# Patient Record
Sex: FEMALE | Race: WHITE | NOT HISPANIC OR LATINO | Employment: FULL TIME | URBAN - METROPOLITAN AREA
[De-identification: names, ages, dates, MRNs, and addresses within clinical notes are randomized per-mention and may not be internally consistent; named-entity substitution may affect disease eponyms.]

---

## 2024-01-08 ENCOUNTER — TELEPHONE (OUTPATIENT)
Dept: PRIMARY CARE | Facility: CLINIC | Age: 39
End: 2024-01-08
Payer: COMMERCIAL

## 2024-01-08 DIAGNOSIS — F41.9 ANXIETY: Primary | ICD-10-CM

## 2024-01-08 RX ORDER — ESCITALOPRAM OXALATE 20 MG/1
20 TABLET ORAL
Status: CANCELLED | OUTPATIENT
Start: 2024-01-08

## 2024-01-08 RX ORDER — ESCITALOPRAM OXALATE 20 MG/1
20 TABLET ORAL
COMMUNITY
Start: 2022-08-15 | End: 2024-01-09 | Stop reason: SDUPTHER

## 2024-01-08 NOTE — TELEPHONE ENCOUNTER
Name of medication & dose:  Escitalopram Oxalate 20 MG     Quantity & refills requested: n/a    Amount of medication remaining:  n/a    If out of medication, for how long? n/a     Last office visit:  1/13/2023    Last labs (if applicable):      Future appointment? 2/1/24- offered next available 1/10/24 and patient declined due to work schedule    Pharmacy verified.  on file    Allergies updated.       The patient was informed the requested medication request will be processed within 3 business days unless they are contacted by a staff member to advise otherwise.

## 2024-01-09 RX ORDER — ESCITALOPRAM OXALATE 20 MG/1
20 TABLET ORAL
Qty: 90 TABLET | Refills: 0 | Status: SHIPPED | OUTPATIENT
Start: 2024-01-09 | End: 2024-02-01 | Stop reason: SDUPTHER

## 2024-02-01 ENCOUNTER — OFFICE VISIT (OUTPATIENT)
Dept: PRIMARY CARE | Facility: CLINIC | Age: 39
End: 2024-02-01
Payer: COMMERCIAL

## 2024-02-01 VITALS
WEIGHT: 217 LBS | HEART RATE: 72 BPM | SYSTOLIC BLOOD PRESSURE: 118 MMHG | OXYGEN SATURATION: 95 % | DIASTOLIC BLOOD PRESSURE: 82 MMHG | BODY MASS INDEX: 36.11 KG/M2

## 2024-02-01 DIAGNOSIS — M54.9 UPPER BACK PAIN: ICD-10-CM

## 2024-02-01 DIAGNOSIS — F41.9 ANXIETY: Primary | ICD-10-CM

## 2024-02-01 DIAGNOSIS — Z13.220 SCREENING FOR HYPERLIPIDEMIA: ICD-10-CM

## 2024-02-01 DIAGNOSIS — N62 LARGE BREASTS: ICD-10-CM

## 2024-02-01 DIAGNOSIS — Z13.1 SCREENING FOR DIABETES MELLITUS: ICD-10-CM

## 2024-02-01 PROCEDURE — 1036F TOBACCO NON-USER: CPT | Performed by: FAMILY MEDICINE

## 2024-02-01 PROCEDURE — 99214 OFFICE O/P EST MOD 30 MIN: CPT | Performed by: FAMILY MEDICINE

## 2024-02-01 RX ORDER — ESCITALOPRAM OXALATE 20 MG/1
20 TABLET ORAL
Qty: 90 TABLET | Refills: 3 | Status: SHIPPED | OUTPATIENT
Start: 2024-02-01

## 2024-02-01 RX ORDER — CEFDINIR 300 MG/1
300 CAPSULE ORAL EVERY 12 HOURS
COMMUNITY
Start: 2024-01-26

## 2024-02-01 RX ORDER — BENZONATATE 100 MG/1
100 CAPSULE ORAL 3 TIMES DAILY PRN
COMMUNITY
Start: 2024-01-15

## 2024-02-01 NOTE — PATIENT INSTRUCTIONS
"Get a step counter and use it.  Netflix \"Secrets of the Blue Zones.\"   3.    Focus on more fruits/veggies, less processed food, more water  4.   After a couple months get fasting labs done.  "

## 2024-02-01 NOTE — PROGRESS NOTES
Subjective   Patient ID: Livia Renteria is a 38 y.o. female who presents for Anxiety (Patient is here for anxiety follow up and med refill. ).  She forgot to schedule and is overdue for wellness.  She is happy with her current dose of Lexapro and wants to continue this.    She asked about a referral to possibly consider breast reduction surgery.  She has to buy special bras and she has a lot of upper back pain and she finally feels ready to do this.  Sometimes the upper back pain seems to creep up her neck and cause headaches also.    Histories reviewed      Objective   /82   Pulse 72   Wt 98.4 kg (217 lb)   LMP 01/31/2024   SpO2 95%   BMI 36.11 kg/m²    Physical Exam    Alert adult, NAD  Affect pleasant  Heart RRR no murmur  Lungs CTA bilat  Patient was not undressed but she does have large breasts    Problem List Items Addressed This Visit    None  Visit Diagnoses         Codes    Anxiety    -  Primary F41.9    Relevant Medications    escitalopram (Lexapro) 20 mg tablet    Screening for hyperlipidemia     Z13.220    Relevant Orders    Lipid Panel    Screening for diabetes mellitus     Z13.1    Relevant Orders    Hemoglobin A1C    Large breasts     N62    Relevant Orders    Referral to Plastic Surgery    Upper back pain     M54.9    Relevant Orders    Referral to Plastic Surgery          Previsit labs ordered for her upcoming wellness visit

## 2024-04-23 ENCOUNTER — OFFICE VISIT (OUTPATIENT)
Dept: DERMATOLOGY | Facility: CLINIC | Age: 39
End: 2024-04-23
Payer: COMMERCIAL

## 2024-04-23 DIAGNOSIS — L71.9 ROSACEA: ICD-10-CM

## 2024-04-23 DIAGNOSIS — L81.4 LENTIGO: ICD-10-CM

## 2024-04-23 DIAGNOSIS — D22.71 MELANOCYTIC NEVI OF RIGHT LOWER LIMB, INCLUDING HIP: ICD-10-CM

## 2024-04-23 DIAGNOSIS — D22.60 MELANOCYTIC NEVI OF UNSPECIFIED UPPER LIMB, INCLUDING SHOULDER: ICD-10-CM

## 2024-04-23 DIAGNOSIS — L57.8 PHOTOAGING OF SKIN: ICD-10-CM

## 2024-04-23 DIAGNOSIS — Z12.83 ENCOUNTER FOR SCREENING FOR MALIGNANT NEOPLASM OF SKIN: Primary | ICD-10-CM

## 2024-04-23 DIAGNOSIS — D18.01 HEMANGIOMA OF SKIN: ICD-10-CM

## 2024-04-23 DIAGNOSIS — L21.9 SEBORRHEIC DERMATITIS: ICD-10-CM

## 2024-04-23 DIAGNOSIS — D22.72 MELANOCYTIC NEVI OF LEFT LOWER EXTREMITY OR HIP: ICD-10-CM

## 2024-04-23 PROCEDURE — 99214 OFFICE O/P EST MOD 30 MIN: CPT | Performed by: DERMATOLOGY

## 2024-04-23 PROCEDURE — 1036F TOBACCO NON-USER: CPT | Performed by: DERMATOLOGY

## 2024-04-23 RX ORDER — FLUOCINONIDE TOPICAL SOLUTION USP, 0.05% 0.5 MG/ML
SOLUTION TOPICAL
Qty: 60 ML | Refills: 3 | Status: SHIPPED | OUTPATIENT
Start: 2024-04-23

## 2024-04-23 RX ORDER — METRONIDAZOLE 7.5 MG/G
CREAM TOPICAL
Qty: 45 G | Refills: 3 | Status: SHIPPED | OUTPATIENT
Start: 2024-04-23

## 2024-04-23 RX ORDER — SULFACETAMIDE SODIUM, SULFUR 100; 50 MG/G; MG/G
EMULSION TOPICAL
Qty: 681 G | Refills: 1 | Status: SHIPPED | OUTPATIENT
Start: 2024-04-23

## 2024-04-23 RX ORDER — KETOCONAZOLE 20 MG/ML
SHAMPOO, SUSPENSION TOPICAL
Qty: 120 ML | Refills: 11 | Status: SHIPPED | OUTPATIENT
Start: 2024-04-23

## 2024-04-23 RX ORDER — SULFACETAMIDE SODIUM, SULFUR 100; 50 MG/G; MG/G
EMULSION TOPICAL
Qty: 227 G | Refills: 11 | Status: SHIPPED | OUTPATIENT
Start: 2024-04-23 | End: 2024-04-23

## 2024-04-23 ASSESSMENT — DERMATOLOGY PATIENT ASSESSMENT
ARE YOU TRYING TO GET PREGNANT: NO
WHAT TYPE OF BIRTH CONTROL: IUD
WHERE ARE THESE NEW OR CHANGING LESIONS LOCATED: FACE
ARE YOU AN ORGAN TRANSPLANT RECIPIENT: NO
HAVE YOU HAD OR DO YOU HAVE VASCULAR DISEASE: NO
DO YOU HAVE ANY NEW OR CHANGING LESIONS: YES
FOR PATIENTS COMING IN FOR A FOLLOW-UP VISIT - HAVE THERE BEEN ANY CHANGES IN YOUR HEALTH SINCE YOUR LAST VISIT: NO
ARE YOU ON BIRTH CONTROL: YES
DO YOU HAVE IRREGULAR MENSTRUAL CYCLES: NO
DO YOU USE A TANNING BED: NO
HAVE YOU HAD OR DO YOU HAVE A STAPH INFECTION: NO
DO YOU USE SUNSCREEN: OCCASIONALLY

## 2024-04-23 ASSESSMENT — DERMATOLOGY QUALITY OF LIFE (QOL) ASSESSMENT
RATE HOW BOTHERED YOU ARE BY SYMPTOMS OF YOUR SKIN PROBLEM (EG, ITCHING, STINGING BURNING, HURTING OR SKIN IRRITATION): 6 - ALWAYS BOTHERED
RATE HOW EMOTIONALLY BOTHERED YOU ARE BY YOUR SKIN PROBLEM (FOR EXAMPLE, WORRY, EMBARRASSMENT, FRUSTRATION): 6 - ALWAYS BOTHERED
RATE HOW BOTHERED YOU ARE BY EFFECTS OF YOUR SKIN PROBLEMS ON YOUR ACTIVITIES (EG, GOING OUT, ACCOMPLISHING WHAT YOU WANT, WORK ACTIVITIES OR YOUR RELATIONSHIPS WITH OTHERS): 6 - ALWAYS BOTHERED
ARE THERE EXCLUSIONS OR EXCEPTIONS FOR THE QUALITY OF LIFE ASSESSMENT: NO
DATE THE QUALITY-OF-LIFE ASSESSMENT WAS COMPLETED: 66953
WHAT SINGLE SKIN CONDITION LISTED BELOW IS THE PATIENT ANSWERING THE QUALITY-OF-LIFE ASSESSMENT QUESTIONS ABOUT: NONE OF THE ABOVE

## 2024-04-23 ASSESSMENT — PATIENT GLOBAL ASSESSMENT (PGA): PATIENT GLOBAL ASSESSMENT: PATIENT GLOBAL ASSESSMENT:  3 - MODERATE

## 2024-04-23 ASSESSMENT — ITCH NUMERIC RATING SCALE: HOW SEVERE IS YOUR ITCHING?: 2

## 2024-04-23 NOTE — PROGRESS NOTES
Lucas Renteria is a 38 y.o. female who presents for the following: Skin Check (Pt here for FBSe with no reported hx. Pt reports areas of concern on face. ), Rash (Pt here for Rash on face and neck for 1 month. Currently using Ketoconazole Shampoo and Cream. Has tried MOMETASONE 0.1% CREAM 45GM. Pt reports dryness, redness, itching and bumps. /), and Seborrheic Dermatitis (Pt here to follow up for Seborrheic Dermatitis on Scalp and Ears. Pt currently using Ketoconazole Cream and Shampoo. ).     Review of Systems:  No other skin or systemic complaints other than what is documented elsewhere in the note.    The following portions of the chart were reviewed this encounter and updated as appropriate:         Skin Cancer History  No skin cancer on file.      Specialty Problems    None       Objective   Well appearing patient in no apparent distress; mood and affect are within normal limits.    A full examination was performed including scalp, head, eyes, ears, nose, lips, neck, chest, axillae, abdomen, back, buttocks, bilateral upper extremities, bilateral lower extremities, hands, feet, fingers, toes, fingernails, and toenails. All findings within normal limits unless otherwise noted below.    Assessment/Plan   1. Encounter for screening for malignant neoplasm of skin  No suspicious lesions noted on examination today    The risk of chronic, cumulative sun damage and risk of development of skin cancer was reviewed today.   The importance of sun protection was reviewed: including the use of a broad spectrum sunscreen that protects against both UVA/UVB rays, with ingredients such as Zinc oxide or titanium dioxide, wearing sun protective clothing and sun avoidance. We reviewed the warning signs of non-melanoma skin cancer and ABCDEs of melanoma  Please follow up should you notice any new or changing pre-existing skin lesion.    Related Procedures  Follow Up In Dermatology - Established Patient    2. Seborrheic  dermatitis  Left Cavum, Right Cavum, Scalp  Erythema with overlying greasy scale.    The chronic and intermittently flaring nature of this skin condition was discussed with patient today.   This is due to a yeast that everyone has on their skin that results in redness, dryness and in some patients itching.    Various treatment options were reviewed including topical antifungals and topical steroids depending upon severity and symptoms. Patient advised we cannot cure this condition, but it can be controlled.     Continue ketoconazole 2% shampoo every other day for 5 minutes then rinse    Add fluocinonide 0.05% scalp solution. Patient to apply 2x daily x 2 wks then decrease to 2x/day 2 days per week. Can repeat full 2 week course as often as every 6-8 weeks as needed for flaring. Side effects of topical steroids includes, but is not limited to skin atrophy and dyspigmentation.      ketoconazole (NIZOral) 2 % shampoo - Left Cavum, Right Cavum, Scalp  Lather for 3-5 minutes every other day    Related Medications  fluocinonide (Lidex) 0.05 % external solution  Apply to scalp 2x daily x 2 weeks then 2x daily 2-3 days/wk if needed    3. Photoaging of skin  Mottled pigmentation with telangiectasias and brown reticular macules in sun exposed areas of the body.    The risk of chronic, cumulative sun damage and risk of development of skin cancer was reviewed today.   The importance of sun protection was reviewed: including the use of a broad spectrum sunscreen that protects against both UVA/UVB rays, with ingredients such as Zinc oxide or titanium dioxide, wearing sun protective clothing and sun avoidance. We reviewed the warning signs of non-melanoma skin cancer and ABCDEs of melanoma  Please follow up should you notice any new or changing pre-existing skin lesion.    4. Rosacea  Head - Anterior (Face)  Mid face erythema with telangiectasias and scattered inflammatory papules.    The chronic and intermittently flaring nature  of the skin condition was discussed with the patient today. Discussed common triggers associated with rosacea including sun exposure, spicy foods, alcohol, and stress. The various treatment options and risks and benefits reviewed.     Patient to begin sulfacetamide sodium-sulfur 10-5% cleanser daily, metronidazole 0.75% cream 2x daily.  Do not restart topical mometasone due to rebound that is likely to occur with use.    Minocycyline in reserve    sulfacetamide sodium-sulfur (Avar, Plexion) 10-5 % (w/w) topical emulsion - Head - Anterior (Face)  Lather for 1-2 minutes on face then rinse once daily    metroNIDAZOLE (Metrocream) 0.75 % cream - Head - Anterior (Face)  Apply to face 2x daily    5. Lentigo  Scattered tan macules in sun-exposed areas.    These are benign skin lesions due to sun exposure. They will darken in response to sun exposure. They should be monitored for change in size, shape or color.  These lesions can be treated cosmetically with topical creams, liquid nitrogen and a variety of lasers.    6. Hemangioma of skin  Cherry red papules    The benign nature of these skin lesions were reviewed, no treatment is necessary.   Please follow up for any new or pre-existing lesion that is changing in size, shape, color, becomes painful, tender, itches or bleed.    7. Melanocytic nevi of unspecified upper limb, including shoulder (2)  Left Arm, Right Arm  Scattered, uniform and benign-appearing, regular brown melanocytic papules and macules.    Clinically benign appearing nevi, no treatment is necessary.  The importance of sun protection was reviewed: including the use of a broad spectrum sunscreen that protects against both UVA/UVB rays, with ingredients such as Zinc oxide or titanium dioxide, wearing sun protective clothing and sun avoidance.   ABCDEs of melanoma reviewed.  Please follow up should you notice any new or changing pre-existing skin lesion.    8. Melanocytic nevi of left lower extremity or  hip  Left Leg  Scattered, uniform and benign-appearing, regular brown melanocytic papules and macules.    Clinically benign appearing nevi, no treatment is necessary.  The importance of sun protection was reviewed: including the use of a broad spectrum sunscreen that protects against both UVA/UVB rays, with ingredients such as Zinc oxide or titanium dioxide, wearing sun protective clothing and sun avoidance.   ABCDEs of melanoma reviewed.  Please follow up should you notice any new or changing pre-existing skin lesion.    9. Melanocytic nevi of right lower limb, including hip  Right Leg  Scattered, uniform and benign-appearing, regular brown melanocytic papules and macules.    Clinically benign appearing nevi, no treatment is necessary.  The importance of sun protection was reviewed: including the use of a broad spectrum sunscreen that protects against both UVA/UVB rays, with ingredients such as Zinc oxide or titanium dioxide, wearing sun protective clothing and sun avoidance.   ABCDEs of melanoma reviewed.  Please follow up should you notice any new or changing pre-existing skin lesion.        Follow up in 1 year.

## 2024-05-06 ENCOUNTER — OFFICE VISIT (OUTPATIENT)
Dept: PLASTIC SURGERY | Facility: CLINIC | Age: 39
End: 2024-05-06
Payer: COMMERCIAL

## 2024-05-06 VITALS
SYSTOLIC BLOOD PRESSURE: 115 MMHG | HEART RATE: 67 BPM | DIASTOLIC BLOOD PRESSURE: 80 MMHG | HEIGHT: 64 IN | WEIGHT: 216.4 LBS | BODY MASS INDEX: 36.95 KG/M2

## 2024-05-06 DIAGNOSIS — M54.9 UPPER BACK PAIN: ICD-10-CM

## 2024-05-06 DIAGNOSIS — N62 LARGE BREASTS: ICD-10-CM

## 2024-05-06 PROCEDURE — 99204 OFFICE O/P NEW MOD 45 MIN: CPT | Performed by: PHYSICIAN ASSISTANT

## 2024-05-06 NOTE — PROGRESS NOTES
Department of Plastic and Reconstructive Surgery           Initial Office Consultation    Date: 05/06/24  Referring Provider: Deborah Ozuna MD  Primary Care Provider: Deborah Ozuna MD      Lucas Renteria is a 38 y.o. female who was referred by Deborah Ozuna MD  for evaluation of upper back pain secondary to macromastia.    She presents today to discuss ongoing back and shoulder pain. She endorses that since a teenager she has always had larger breasts. She notes that she sought consultation for breast reduction at the age of 19 however decided against it as she wanted to wait until she was finished having children. She endorses daily pain that increases with activity and with working and exercising. She rates her pain at 4/10 at baseline and will increase from there.  She endorses bra stap grooving from the weight of her breast. She has attempted conservative measures for her pain including heat/ice, OTC pain medications including tylenol and ibuprofen for her pain without resolution. She has attempted physical therapy in the past and denied improvement or resolution of symptoms.  She endorses that her breasts slightly increased in size after pregnancy and breast feeding. Her notes her current bra size is a 40F/G. She has complaints of bra strap grooving and neck/shoulder discomfort.       PMH: anxiety  Surgical Hx: right knee surgery 28 yrs ago  Family Hx: non-contributory  Smoking: non-smoker      Review of Systems   All other systems have been reviewed with the patient and have been found to be negative with exception to the chief complaint as mentioned in the history of present illness.    ROS: As noted in history of present illness    - CONSTITUTIONAL: Denies weight loss, fever and chills.  - HEENT: Denies changes in vision and hearing.  - RESPIRATORY: Denies SOB and cough, difficulty breathing  - CV: Denies palpitations and CP  - GI: Denies abdominal pain, nausea, vomiting and  diarrhea.  - : Denies dysuria and urinary frequency.  - MSK: positive for back and shoulder pain  - SKIN: Denies rash and pruritus.  - NEUROLOGICAL: Denies headache and syncope.    Objective   Vital Signs:   Vitals:    05/06/24 1054   BP: 115/80   Pulse: 67         Gen: interactive and pleasant  Head: NCAT  Eyes: EOMI, PERRLA  Mouth: MMM  Throat: trachea midline  Cor: RRR  Pulm: nonlabored breathing  Abd: s/nt/nd  Neuro: AAOx3  Ext: extremities perfused    Focused exam of the: breast                                R                  L  SN:NAC             33cm              33.5cm  NAC:IMF            17cm             18cm         BW                    15cm              15cm        NAC diam         5m              5cm                                                                                           Ptosis Grade     2               2                                                       Bra Size         40F/G     Body mass index is 37.14 kg/m².  Body surface area is 2.11 meters squared.      Assessment/Plan     Livia Renteria is a 38 y.o. female who was referred by Deborah Ozuna MD for evaluation of upper back pain secondary to macromastia.    She did undoubtedly has macromastia which is symptomatic causing her pain, bra strap grooving, trapezial hypertrophy. She has attempted PT, chiropractic, massage, OTC pain medications, and conservative measures for pain control without improvement or resolution of daily discomfort. Today we discussed moving forward with surgical intervention for continued symptomatic macromastia. I educated the patient on the Schnur scale as a use of measurement to determine to amount of breast tissue to take based on body surface area. Her BSA is 2.1. The schnur scale places her at the required amount per side of 750cc.     I discussed with the patient that I believe this can be easily achieved and will help to alleviate back and neck pain she is experiencing. Patient verbalized  understanding of the size requirement based on the Schnur scale and agrees to continue with surgical planning for breast reduction. We discussed today she intends to lose weight prior to surgery with goal BMI of 35 or less. We discussed she plans to lose 7lbs.        Plan:   Case submitted for bilateral breast reduction.     I spent 45 minutes with this patient. Greater than 50% of this time was spent in the counselling and/or coordination of care of this patient.  This note was created using voice recognition software and was not corrected for typographical or grammatical errors.    Signature: Wanda Jurado PA-C  Date: 05/06/24

## 2024-05-29 ENCOUNTER — PREP FOR PROCEDURE (OUTPATIENT)
Dept: OCCUPATIONAL MEDICINE | Facility: HOSPITAL | Age: 39
End: 2024-05-29
Payer: COMMERCIAL

## 2024-05-29 DIAGNOSIS — N62 MACROMASTIA: Primary | ICD-10-CM

## 2024-08-05 ENCOUNTER — APPOINTMENT (OUTPATIENT)
Dept: PLASTIC SURGERY | Facility: CLINIC | Age: 39
End: 2024-08-05
Payer: COMMERCIAL

## 2024-08-05 VITALS
HEART RATE: 79 BPM | BODY MASS INDEX: 37.59 KG/M2 | WEIGHT: 220.2 LBS | HEIGHT: 64 IN | SYSTOLIC BLOOD PRESSURE: 119 MMHG | DIASTOLIC BLOOD PRESSURE: 86 MMHG

## 2024-08-05 DIAGNOSIS — N62 MACROMASTIA: Primary | ICD-10-CM

## 2024-08-05 PROCEDURE — 3008F BODY MASS INDEX DOCD: CPT | Performed by: SURGERY

## 2024-08-05 PROCEDURE — 99213 OFFICE O/P EST LOW 20 MIN: CPT | Performed by: SURGERY

## 2024-08-05 NOTE — PROGRESS NOTES
Department of Plastic and Reconstructive Surgery           Initial Office Consultation    Date: 08/05/24  Referring Provider: Deborah Ozuna MD  Primary Care Provider: Deborah Ozuna MD      Lucas Renteria is a 38 y.o. female who was referred by Deborah Ozuna MD  for evaluation of upper back pain secondary to macromastia.    She presents today to discuss ongoing back and shoulder pain. She endorses that since a teenager she has always had larger breasts. She notes that she sought consultation for breast reduction at the age of 19 however decided against it as she wanted to wait until she was finished having children. She endorses daily pain that increases with activity and with working and exercising. She rates her pain at 4/10 at baseline and will increase from there.  She endorses bra stap grooving from the weight of her breast. She has attempted conservative measures for her pain including heat/ice, OTC pain medications including tylenol and ibuprofen for her pain without resolution. She has attempted physical therapy in the past and denied improvement or resolution of symptoms.  She endorses that her breasts slightly increased in size after pregnancy and breast feeding. Her notes her current bra size is a 40F/G. She has complaints of bra strap grooving and neck/shoulder discomfort.       PMH: anxiety  Surgical Hx: right knee surgery 28 yrs ago  Family Hx: non-contributory  Smoking: non-smoker      Review of Systems   All other systems have been reviewed with the patient and have been found to be negative with exception to the chief complaint as mentioned in the history of present illness.    ROS: As noted in history of present illness    - CONSTITUTIONAL: Denies weight loss, fever and chills.  - HEENT: Denies changes in vision and hearing.  - RESPIRATORY: Denies SOB and cough, difficulty breathing  - CV: Denies palpitations and CP  - GI: Denies abdominal pain, nausea, vomiting and  diarrhea.  - : Denies dysuria and urinary frequency.  - MSK: positive for back and shoulder pain  - SKIN: Denies rash and pruritus.  - NEUROLOGICAL: Denies headache and syncope.    Objective   Vital Signs:   There were no vitals filed for this visit.        Gen: interactive and pleasant  Head: NCAT  Eyes: EOMI, PERRLA  Mouth: MMM  Throat: trachea midline  Cor: RRR  Pulm: nonlabored breathing  Abd: s/nt/nd  Neuro: AAOx3  Ext: extremities perfused    Focused exam of the: breast                                R                  L  SN:NAC             33cm              33.5cm  NAC:IMF            17cm             18cm         BW                    15cm              15cm        NAC diam         5m              5cm                                                                                           Ptosis Grade     2               2                                                       Bra Size         40F/G     Body mass index is 37.14 kg/m².  Body surface area is 2.11 meters squared.      Assessment/Plan     Livia Renteria is a 38 y.o. female who was referred by Deborah Ozuna MD for evaluation of upper back pain secondary to macromastia.    She did undoubtedly has macromastia which is symptomatic causing her pain, bra strap grooving, trapezial hypertrophy. She has attempted PT, chiropractic, massage, OTC pain medications, and conservative measures for pain control without improvement or resolution of daily discomfort. Today we discussed moving forward with surgical intervention for continued symptomatic macromastia. I educated the patient on the Schnur scale as a use of measurement to determine to amount of breast tissue to take based on body surface area. Her BSA is 2.1. The schnur scale places her at the required amount per side of 750cc.     I discussed with the patient that I believe this can be easily achieved and will help to alleviate back and neck pain she is experiencing. Patient verbalized  understanding of the size requirement based on the Schnur scale and agrees to continue with surgical planning for breast reduction. We discussed today she intends to lose weight prior to surgery with goal BMI of 35 or less. We discussed she plans to lose 7lbs.        Plan:   Case submitted for bilateral breast reduction.   Scheduled for 9/17/2024      I discussed with the patient that this is reconstructive surgery, and there are no guarantees of results.  Sometimes, patients are dissatisfied and sometimes patients require revision surgery.  If there is a need to return to surgery to correct any problems or complications, we indicated that in some instances, the return to the operating room may not covered by insurance.  I indicated that I do not charge the patient for return to the operating room, but there WILL BE charges for operating room/facility fees and anesthesia fees, over which we have no control.       I spent 45 minutes with this patient. Greater than 50% of this time was spent in the counselling and/or coordination of care of this patient.  This note was created using voice recognition software and was not corrected for typographical or grammatical errors.    Signature: Morris Martinez MD  Date: 08/05/24

## 2024-08-29 ENCOUNTER — TELEPHONE (OUTPATIENT)
Dept: PLASTIC SURGERY | Facility: CLINIC | Age: 39
End: 2024-08-29
Payer: COMMERCIAL

## 2024-08-29 DIAGNOSIS — Z01.818 PREOP TESTING: ICD-10-CM

## 2024-08-29 NOTE — TELEPHONE ENCOUNTER
Patient called to inquire if it was okay that she get an epidural pain injection on 9/3/2024 prior to her scheduled breast reduction on 9/17/2024.  I spoke with Dr. Martinez who stated patient may get the pain injection.  I updated patient with this information.  I also reviewed preop and post op instructions with patient.

## 2024-09-03 RX ORDER — CHLORHEXIDINE GLUCONATE 40 MG/ML
SOLUTION TOPICAL ONCE
Qty: 236 ML | Refills: 0 | Status: SHIPPED | OUTPATIENT
Start: 2024-09-03 | End: 2024-09-03

## 2024-09-06 ENCOUNTER — LAB (OUTPATIENT)
Dept: LAB | Facility: LAB | Age: 39
End: 2024-09-06
Payer: COMMERCIAL

## 2024-09-06 DIAGNOSIS — Z01.818 PREOP TESTING: ICD-10-CM

## 2024-09-06 LAB
ALBUMIN SERPL BCP-MCNC: 4.1 G/DL (ref 3.4–5)
ALP SERPL-CCNC: 42 U/L (ref 33–110)
ALT SERPL W P-5'-P-CCNC: 19 U/L (ref 7–45)
ANION GAP SERPL CALC-SCNC: 10 MMOL/L (ref 10–20)
AST SERPL W P-5'-P-CCNC: 14 U/L (ref 9–39)
BILIRUB SERPL-MCNC: 1 MG/DL (ref 0–1.2)
BUN SERPL-MCNC: 14 MG/DL (ref 6–23)
CALCIUM SERPL-MCNC: 8.9 MG/DL (ref 8.6–10.3)
CHLORIDE SERPL-SCNC: 104 MMOL/L (ref 98–107)
CO2 SERPL-SCNC: 26 MMOL/L (ref 21–32)
CREAT SERPL-MCNC: 0.78 MG/DL (ref 0.5–1.05)
EGFRCR SERPLBLD CKD-EPI 2021: >90 ML/MIN/1.73M*2
ERYTHROCYTE [DISTWIDTH] IN BLOOD BY AUTOMATED COUNT: 11.8 % (ref 11.5–14.5)
GLUCOSE SERPL-MCNC: 108 MG/DL (ref 74–99)
HCT VFR BLD AUTO: 40.5 % (ref 36–46)
HGB BLD-MCNC: 13.4 G/DL (ref 12–16)
MCH RBC QN AUTO: 32.3 PG (ref 26–34)
MCHC RBC AUTO-ENTMCNC: 33.1 G/DL (ref 32–36)
MCV RBC AUTO: 98 FL (ref 80–100)
NRBC BLD-RTO: 0 /100 WBCS (ref 0–0)
PLATELET # BLD AUTO: 199 X10*3/UL (ref 150–450)
POTASSIUM SERPL-SCNC: 4.1 MMOL/L (ref 3.5–5.3)
PROT SERPL-MCNC: 6.1 G/DL (ref 6.4–8.2)
RBC # BLD AUTO: 4.15 X10*6/UL (ref 4–5.2)
SODIUM SERPL-SCNC: 136 MMOL/L (ref 136–145)
WBC # BLD AUTO: 7 X10*3/UL (ref 4.4–11.3)

## 2024-09-06 PROCEDURE — 80053 COMPREHEN METABOLIC PANEL: CPT

## 2024-09-06 PROCEDURE — 85027 COMPLETE CBC AUTOMATED: CPT

## 2024-09-06 PROCEDURE — 36415 COLL VENOUS BLD VENIPUNCTURE: CPT

## 2024-09-09 RX ORDER — TIZANIDINE HYDROCHLORIDE 4 MG/1
4 CAPSULE, GELATIN COATED ORAL 3 TIMES DAILY
COMMUNITY

## 2024-09-09 RX ORDER — GABAPENTIN 300 MG/1
300 CAPSULE ORAL 3 TIMES DAILY
COMMUNITY

## 2024-09-09 RX ORDER — CELECOXIB 200 MG/1
200 CAPSULE ORAL DAILY
COMMUNITY

## 2024-09-17 ENCOUNTER — HOSPITAL ENCOUNTER (OUTPATIENT)
Facility: CLINIC | Age: 39
Setting detail: OUTPATIENT SURGERY
Discharge: HOME | End: 2024-09-17
Attending: SURGERY | Admitting: SURGERY
Payer: COMMERCIAL

## 2024-09-17 ENCOUNTER — ANESTHESIA (OUTPATIENT)
Dept: OPERATING ROOM | Facility: CLINIC | Age: 39
End: 2024-09-17
Payer: COMMERCIAL

## 2024-09-17 ENCOUNTER — ANESTHESIA EVENT (OUTPATIENT)
Dept: OPERATING ROOM | Facility: CLINIC | Age: 39
End: 2024-09-17
Payer: COMMERCIAL

## 2024-09-17 VITALS
TEMPERATURE: 97.2 F | HEART RATE: 64 BPM | OXYGEN SATURATION: 96 % | DIASTOLIC BLOOD PRESSURE: 65 MMHG | SYSTOLIC BLOOD PRESSURE: 103 MMHG | RESPIRATION RATE: 16 BRPM

## 2024-09-17 DIAGNOSIS — N62 MACROMASTIA: Primary | ICD-10-CM

## 2024-09-17 DIAGNOSIS — R11.2 PONV (POSTOPERATIVE NAUSEA AND VOMITING): ICD-10-CM

## 2024-09-17 DIAGNOSIS — G89.18 POST-OP PAIN: ICD-10-CM

## 2024-09-17 DIAGNOSIS — Z98.890 PONV (POSTOPERATIVE NAUSEA AND VOMITING): ICD-10-CM

## 2024-09-17 PROCEDURE — 3700000001 HC GENERAL ANESTHESIA TIME - INITIAL BASE CHARGE: Performed by: SURGERY

## 2024-09-17 PROCEDURE — A19318 PR REDUCTION OF LARGE BREAST: Performed by: NURSE ANESTHETIST, CERTIFIED REGISTERED

## 2024-09-17 PROCEDURE — 2720000007 HC OR 272 NO HCPCS: Performed by: SURGERY

## 2024-09-17 PROCEDURE — 7100000001 HC RECOVERY ROOM TIME - INITIAL BASE CHARGE: Performed by: SURGERY

## 2024-09-17 PROCEDURE — 7100000002 HC RECOVERY ROOM TIME - EACH INCREMENTAL 1 MINUTE: Performed by: SURGERY

## 2024-09-17 PROCEDURE — 19318 BREAST REDUCTION: CPT | Performed by: PHYSICIAN ASSISTANT

## 2024-09-17 PROCEDURE — 2500000001 HC RX 250 WO HCPCS SELF ADMINISTERED DRUGS (ALT 637 FOR MEDICARE OP): Performed by: NURSE ANESTHETIST, CERTIFIED REGISTERED

## 2024-09-17 PROCEDURE — 19318 BREAST REDUCTION: CPT | Performed by: SURGERY

## 2024-09-17 PROCEDURE — 7100000009 HC PHASE TWO TIME - INITIAL BASE CHARGE: Performed by: SURGERY

## 2024-09-17 PROCEDURE — A19318 PR REDUCTION OF LARGE BREAST: Performed by: ANESTHESIOLOGY

## 2024-09-17 PROCEDURE — 3700000002 HC GENERAL ANESTHESIA TIME - EACH INCREMENTAL 1 MINUTE: Performed by: SURGERY

## 2024-09-17 PROCEDURE — 7100000010 HC PHASE TWO TIME - EACH INCREMENTAL 1 MINUTE: Performed by: SURGERY

## 2024-09-17 PROCEDURE — 2500000002 HC RX 250 W HCPCS SELF ADMINISTERED DRUGS (ALT 637 FOR MEDICARE OP, ALT 636 FOR OP/ED): Performed by: NURSE ANESTHETIST, CERTIFIED REGISTERED

## 2024-09-17 PROCEDURE — 3600000008 HC OR TIME - EACH INCREMENTAL 1 MINUTE - PROCEDURE LEVEL THREE: Performed by: SURGERY

## 2024-09-17 PROCEDURE — 15777 ACELLULAR DERM MATRIX IMPLT: CPT | Performed by: SURGERY

## 2024-09-17 PROCEDURE — 2500000004 HC RX 250 GENERAL PHARMACY W/ HCPCS (ALT 636 FOR OP/ED): Performed by: ANESTHESIOLOGY

## 2024-09-17 PROCEDURE — 2500000004 HC RX 250 GENERAL PHARMACY W/ HCPCS (ALT 636 FOR OP/ED): Performed by: NURSE ANESTHETIST, CERTIFIED REGISTERED

## 2024-09-17 PROCEDURE — 3600000003 HC OR TIME - INITIAL BASE CHARGE - PROCEDURE LEVEL THREE: Performed by: SURGERY

## 2024-09-17 PROCEDURE — 2500000005 HC RX 250 GENERAL PHARMACY W/O HCPCS: Performed by: NURSE ANESTHETIST, CERTIFIED REGISTERED

## 2024-09-17 PROCEDURE — 2500000005 HC RX 250 GENERAL PHARMACY W/O HCPCS: Performed by: SURGERY

## 2024-09-17 PROCEDURE — 2780000003 HC OR 278 NO HCPCS: Performed by: SURGERY

## 2024-09-17 PROCEDURE — 2500000001 HC RX 250 WO HCPCS SELF ADMINISTERED DRUGS (ALT 637 FOR MEDICARE OP): Performed by: ANESTHESIOLOGY

## 2024-09-17 DEVICE — IMPLANTABLE DEVICE: Type: IMPLANTABLE DEVICE | Site: BREAST | Status: FUNCTIONAL

## 2024-09-17 RX ORDER — HYDROMORPHONE HYDROCHLORIDE 1 MG/ML
0.4 INJECTION, SOLUTION INTRAMUSCULAR; INTRAVENOUS; SUBCUTANEOUS EVERY 5 MIN PRN
Status: DISCONTINUED | OUTPATIENT
Start: 2024-09-17 | End: 2024-09-17 | Stop reason: HOSPADM

## 2024-09-17 RX ORDER — LIDOCAINE HYDROCHLORIDE 20 MG/ML
INJECTION, SOLUTION INFILTRATION; PERINEURAL AS NEEDED
Status: DISCONTINUED | OUTPATIENT
Start: 2024-09-17 | End: 2024-09-17

## 2024-09-17 RX ORDER — ALBUTEROL SULFATE 0.83 MG/ML
2.5 SOLUTION RESPIRATORY (INHALATION) ONCE AS NEEDED
Status: DISCONTINUED | OUTPATIENT
Start: 2024-09-17 | End: 2024-09-17 | Stop reason: HOSPADM

## 2024-09-17 RX ORDER — BUPIVACAINE HYDROCHLORIDE AND EPINEPHRINE 5; 5 MG/ML; UG/ML
INJECTION, SOLUTION EPIDURAL; INTRACAUDAL; PERINEURAL AS NEEDED
Status: DISCONTINUED | OUTPATIENT
Start: 2024-09-17 | End: 2024-09-17 | Stop reason: HOSPADM

## 2024-09-17 RX ORDER — OXYCODONE HYDROCHLORIDE 5 MG/1
5 TABLET ORAL EVERY 4 HOURS PRN
Status: DISCONTINUED | OUTPATIENT
Start: 2024-09-17 | End: 2024-09-17 | Stop reason: HOSPADM

## 2024-09-17 RX ORDER — SODIUM CHLORIDE, SODIUM LACTATE, POTASSIUM CHLORIDE, CALCIUM CHLORIDE 600; 310; 30; 20 MG/100ML; MG/100ML; MG/100ML; MG/100ML
INJECTION, SOLUTION INTRAVENOUS CONTINUOUS PRN
Status: DISCONTINUED | OUTPATIENT
Start: 2024-09-17 | End: 2024-09-17

## 2024-09-17 RX ORDER — FENTANYL CITRATE 50 UG/ML
INJECTION, SOLUTION INTRAMUSCULAR; INTRAVENOUS AS NEEDED
Status: DISCONTINUED | OUTPATIENT
Start: 2024-09-17 | End: 2024-09-17

## 2024-09-17 RX ORDER — ASPIRIN 81 MG
100 TABLET, DELAYED RELEASE (ENTERIC COATED) ORAL 2 TIMES DAILY
Qty: 10 TABLET | Refills: 0 | Status: SHIPPED | OUTPATIENT
Start: 2024-09-17 | End: 2024-09-22

## 2024-09-17 RX ORDER — METOCLOPRAMIDE HYDROCHLORIDE 5 MG/ML
10 INJECTION INTRAMUSCULAR; INTRAVENOUS ONCE AS NEEDED
Status: DISCONTINUED | OUTPATIENT
Start: 2024-09-17 | End: 2024-09-17 | Stop reason: HOSPADM

## 2024-09-17 RX ORDER — ONDANSETRON 4 MG/1
4 TABLET, ORALLY DISINTEGRATING ORAL EVERY 8 HOURS PRN
Qty: 20 TABLET | Refills: 0 | OUTPATIENT
Start: 2024-09-17

## 2024-09-17 RX ORDER — ACETAMINOPHEN 325 MG/1
TABLET ORAL AS NEEDED
Status: DISCONTINUED | OUTPATIENT
Start: 2024-09-17 | End: 2024-09-17

## 2024-09-17 RX ORDER — APREPITANT 40 MG/1
CAPSULE ORAL AS NEEDED
Status: DISCONTINUED | OUTPATIENT
Start: 2024-09-17 | End: 2024-09-17

## 2024-09-17 RX ORDER — CEFAZOLIN 1 G/1
INJECTION, POWDER, FOR SOLUTION INTRAVENOUS AS NEEDED
Status: DISCONTINUED | OUTPATIENT
Start: 2024-09-17 | End: 2024-09-17

## 2024-09-17 RX ORDER — METHOCARBAMOL 100 MG/ML
INJECTION, SOLUTION INTRAMUSCULAR; INTRAVENOUS AS NEEDED
Status: DISCONTINUED | OUTPATIENT
Start: 2024-09-17 | End: 2024-09-17

## 2024-09-17 RX ORDER — GABAPENTIN 300 MG/1
CAPSULE ORAL AS NEEDED
Status: DISCONTINUED | OUTPATIENT
Start: 2024-09-17 | End: 2024-09-17

## 2024-09-17 RX ORDER — HYDROMORPHONE HYDROCHLORIDE 0.2 MG/ML
0.2 INJECTION INTRAMUSCULAR; INTRAVENOUS; SUBCUTANEOUS EVERY 5 MIN PRN
Status: DISCONTINUED | OUTPATIENT
Start: 2024-09-17 | End: 2024-09-17 | Stop reason: HOSPADM

## 2024-09-17 RX ORDER — PHENYLEPHRINE HCL IN 0.9% NACL 1 MG/10 ML
SYRINGE (ML) INTRAVENOUS AS NEEDED
Status: DISCONTINUED | OUTPATIENT
Start: 2024-09-17 | End: 2024-09-17

## 2024-09-17 RX ORDER — GABAPENTIN 300 MG/1
300 CAPSULE ORAL 3 TIMES DAILY
COMMUNITY

## 2024-09-17 RX ORDER — AMOXICILLIN 875 MG/1
875 TABLET, FILM COATED ORAL 2 TIMES DAILY
COMMUNITY

## 2024-09-17 RX ORDER — HYDROMORPHONE HYDROCHLORIDE 1 MG/ML
INJECTION, SOLUTION INTRAMUSCULAR; INTRAVENOUS; SUBCUTANEOUS AS NEEDED
Status: DISCONTINUED | OUTPATIENT
Start: 2024-09-17 | End: 2024-09-17

## 2024-09-17 RX ORDER — KETAMINE HYDROCHLORIDE 50 MG/ML
INJECTION, SOLUTION INTRAMUSCULAR; INTRAVENOUS AS NEEDED
Status: DISCONTINUED | OUTPATIENT
Start: 2024-09-17 | End: 2024-09-17

## 2024-09-17 RX ORDER — MIDAZOLAM HYDROCHLORIDE 1 MG/ML
INJECTION, SOLUTION INTRAMUSCULAR; INTRAVENOUS AS NEEDED
Status: DISCONTINUED | OUTPATIENT
Start: 2024-09-17 | End: 2024-09-17

## 2024-09-17 RX ORDER — ONDANSETRON HYDROCHLORIDE 2 MG/ML
INJECTION, SOLUTION INTRAVENOUS AS NEEDED
Status: DISCONTINUED | OUTPATIENT
Start: 2024-09-17 | End: 2024-09-17

## 2024-09-17 RX ORDER — GUAIFENESIN, PSEUDOEPHEDRINE HYDROCHLORIDE 600; 60 MG/1; MG/1
1 TABLET, EXTENDED RELEASE ORAL EVERY 12 HOURS
COMMUNITY

## 2024-09-17 RX ORDER — TRANEXAMIC ACID 100 MG/ML
INJECTION, SOLUTION INTRAVENOUS AS NEEDED
Status: DISCONTINUED | OUTPATIENT
Start: 2024-09-17 | End: 2024-09-17

## 2024-09-17 RX ORDER — SODIUM CHLORIDE, SODIUM LACTATE, POTASSIUM CHLORIDE, CALCIUM CHLORIDE 600; 310; 30; 20 MG/100ML; MG/100ML; MG/100ML; MG/100ML
100 INJECTION, SOLUTION INTRAVENOUS CONTINUOUS
Status: DISCONTINUED | OUTPATIENT
Start: 2024-09-17 | End: 2024-09-17 | Stop reason: HOSPADM

## 2024-09-17 RX ORDER — ONDANSETRON HYDROCHLORIDE 2 MG/ML
4 INJECTION, SOLUTION INTRAVENOUS ONCE AS NEEDED
Status: COMPLETED | OUTPATIENT
Start: 2024-09-17 | End: 2024-09-17

## 2024-09-17 RX ORDER — PROPOFOL 10 MG/ML
INJECTION, EMULSION INTRAVENOUS AS NEEDED
Status: DISCONTINUED | OUTPATIENT
Start: 2024-09-17 | End: 2024-09-17

## 2024-09-17 RX ORDER — HYDROCODONE BITARTRATE AND ACETAMINOPHEN 5; 325 MG/1; MG/1
1 TABLET ORAL EVERY 6 HOURS PRN
Qty: 12 TABLET | Refills: 0 | Status: SHIPPED | OUTPATIENT
Start: 2024-09-17 | End: 2024-09-24

## 2024-09-17 RX ORDER — SODIUM CHLORIDE 0.9 G/100ML
IRRIGANT IRRIGATION AS NEEDED
Status: DISCONTINUED | OUTPATIENT
Start: 2024-09-17 | End: 2024-09-17 | Stop reason: HOSPADM

## 2024-09-17 RX ORDER — LIDOCAINE IN NACL,ISO-OSMOT/PF 30 MG/3 ML
0.1 SYRINGE (ML) INJECTION ONCE
Status: DISCONTINUED | OUTPATIENT
Start: 2024-09-17 | End: 2024-09-17 | Stop reason: HOSPADM

## 2024-09-17 RX ORDER — SCOLOPAMINE TRANSDERMAL SYSTEM 1 MG/1
PATCH, EXTENDED RELEASE TRANSDERMAL AS NEEDED
Status: DISCONTINUED | OUTPATIENT
Start: 2024-09-17 | End: 2024-09-17

## 2024-09-17 RX ORDER — ONDANSETRON 4 MG/1
4 TABLET, ORALLY DISINTEGRATING ORAL EVERY 8 HOURS PRN
Qty: 9 TABLET | Refills: 0 | Status: SHIPPED | OUTPATIENT
Start: 2024-09-17

## 2024-09-17 RX ORDER — CELECOXIB 200 MG/1
CAPSULE ORAL AS NEEDED
Status: DISCONTINUED | OUTPATIENT
Start: 2024-09-17 | End: 2024-09-17

## 2024-09-17 SDOH — HEALTH STABILITY: MENTAL HEALTH: CURRENT SMOKER: 0

## 2024-09-17 ASSESSMENT — PAIN SCALES - GENERAL
PAINLEVEL_OUTOF10: 4
PAINLEVEL_OUTOF10: 3
PAINLEVEL_OUTOF10: 4
PAINLEVEL_OUTOF10: 5 - MODERATE PAIN

## 2024-09-17 ASSESSMENT — PAIN DESCRIPTION - LOCATION
LOCATION: CHEST
LOCATION: CHEST

## 2024-09-17 ASSESSMENT — PAIN - FUNCTIONAL ASSESSMENT
PAIN_FUNCTIONAL_ASSESSMENT: 0-10

## 2024-09-17 ASSESSMENT — COLUMBIA-SUICIDE SEVERITY RATING SCALE - C-SSRS
6. HAVE YOU EVER DONE ANYTHING, STARTED TO DO ANYTHING, OR PREPARED TO DO ANYTHING TO END YOUR LIFE?: NO
2. HAVE YOU ACTUALLY HAD ANY THOUGHTS OF KILLING YOURSELF?: NO
1. IN THE PAST MONTH, HAVE YOU WISHED YOU WERE DEAD OR WISHED YOU COULD GO TO SLEEP AND NOT WAKE UP?: NO

## 2024-09-17 NOTE — ANESTHESIA PROCEDURE NOTES
Airway  Date/Time: 9/17/2024 10:10 AM  Urgency: elective    Airway not difficult    Staffing  Performed: CRNA   Authorized by: Conner Arboleda MD    Performed by: GEO Wade-KIRBY  Patient location during procedure: OR    Indications and Patient Condition  Indications for airway management: anesthesia and airway protection  Spontaneous Ventilation: absent  Sedation level: deep  Preoxygenated: yes  Patient position: sniffing  Mask difficulty assessment: 0 - not attempted    Final Airway Details  Final airway type: supraglottic airway      Successful airway: Supraglottic airway: i-gel.  Size 3     Number of attempts at approach: 1    Additional Comments  Teeth and lips in pre-anesthetic condition.

## 2024-09-17 NOTE — OP NOTE
Plastic Surgery Operative Note       Date: 2024  OR Location: [facility] OR    Name: Livia Renteria : 1985, Age: 38 y.o., MRN: [id number], Sex: female    Diagnosis  Pre-op Diagnosis      * Macromastia [N62] Post-op Diagnosis     * Macromastia [N62]     Procedures  bilateral breast reduction  36083 - DC BREAST REDUCTION  Injection multiple intercostal nerves bilaterally for postoperative pain control not intraoperative pain control  Mesh for soft tissue support    Surgeons      * Morris Martinez - Primary    Resident/Fellow/Other Assistant:  Surgeons and Role:     * Wanda Jurado PA-C - ROHAN First Assist Given the inherent complexity of this surgery, a second surgeon or a surgically skilled physician assistant was necessary for the successful completion of this entire operative procedure. Wanda Jurado served as the surgical assistant and was present for the entire operative procedure and participated in all aspects of patient care. This included transporting the patient to the operating room and entering room with the patient, assisting with preoperative positioning, first assisting throughout the entire surgical procedure by functioning as a second assistant surgeon, assisting with surgical closure, and finally accompanying the patient to recovery.      Procedure Summary  Anesthesia: Anesthesia type not filed in the log.  ASA: ASA status not filed in the log.  Anesthesia Staff: Anesthesiologist: Conner Arboleda MD  CRNA: GEO Wade-CRNA  Estimated Blood Loss: 150mL  Intra-op Medications:   Administrations occurring from 0945 to 1310 on 24:   Medication Name Total Dose   sodium chloride 0.9 % irrigation solution 500 mL   BUPivacaine-EPINEPHrine (PF) (Marcaine w/EPI) 0.5 %-1:200,000 injection 30 mL              Anesthesia Record               Intraprocedure I/O Totals          Intake    Ketamine 0.00 mL    The total shown is the total volume documented since Anesthesia Start  was filed.    Tranexamic Acid 0.00 mL    The total shown is the total volume documented since Anesthesia Start was filed.    LR infusion 1000.00 mL    Total Intake 1000 mL          Specimen:   ID Type Source Tests Collected by Time   1 : RIGHT BREAST TISSUE Tissue BREAST REDUCTION MAMMOPLASTY RIGHT SURGICAL PATHOLOGY EXAM Morris Martinez MD 9/17/2024 1149   2 : LEFT BREAST TISSUE Tissue BREAST REDUCTION MAMMOPLASTY LEFT SURGICAL PATHOLOGY EXAM Morris Martinez MD 9/17/2024 1149        Staff:   Kamaljitulator: Bev  Circulator: Radha Rivas Person: Carlee Rainey Scrub: Hayley Rainey Circulator: Rebeca Rainey Scrub: Nisha         Drains and/or Catheters:   Closed/Suction Drain 1 Lateral RUQ 15 Fr. (Active)       Closed/Suction Drain 2 Lateral LUQ Bulb 15 Fr. (Active)             Implants:  Implants       Type Name Action Serial No.       TIGR MATRIX SURGICAL MESH 20 X 30CM Implanted               Findings: As expected  Specimen weight right side = 841 g  Specimen weight left side = 862 g    Indications: Livia Renteria is an 38 y.o. female who is having surgery for Macromastia [N62].   Livia Renteria is a 38 y.o. female who was referred by Deborah Ozuna MD for evaluation of upper back pain secondary to macromastia.     She undoubtedly has macromastia which is symptomatic causing her pain, bra strap grooving, trapezial hypertrophy. She has attempted PT, chiropractic, massage, OTC pain medications, and conservative measures for pain control without improvement or resolution of daily discomfort. Today we discussed moving forward with surgical intervention for continued symptomatic macromastia. I educated the patient on the Schnur scale as a use of measurement to determine to amount of breast tissue to take based on body surface area. Her BSA is 2.1. The schnur scale places her at the required amount per side of 750g.      In previous discussions, we mentioned with the patient that I believe this can be achieved and  will help to alleviate back and neck pain she is experiencing. Patient verbalized understanding of the size requirement based on the Schnur scale and agrees to continue with surgical planning for breast reduction. We discussed today she intends to lose weight prior to surgery with goal BMI of 35 or less. We had discussed she plans to lose 7lbs before surgery.  We discussed with her that she would rather be larger than smaller after the surgery, and would like to remain a D cup.  We indicated that cup sizing is imprecise but we would plan to take enough to achieve a reasonable size commiserate with her goal.     I discussed with the patient that this is reconstructive surgery to alleviate neck and back pain related to macromastia, and there are no guarantees of results, including aesthetic results.  Sometimes, patients are dissatisfied and sometimes patients require revision surgery.  If there is a need to return to surgery to correct any problems or complications, we indicated that in some instances, the return to the operating room may not covered by insurance.  I indicated that I do not charge the patient for return to the operating room, but there WILL BE charges for operating room/facility fees and anesthesia fees, over which we have no control.          INFORMED CONSENT  Patient was told the risks, benefits, INDICATIONS, contraindications, and alternatives to the procedure. Risks include but not limited to pain, infection, bleeding, hematoma, seroma, injury to neurovascular and tendinous structures, asymmetry, NAC hypoperfusion and loss, asymmetrical NAC appearance, and need for subsequent surgeries.     The patient demonstrated understanding of these risks and agreed to proceed with surgery.   Advance directives discussed.  Team approach explained.      The patient consented and wished to proceed with the procedure and for medical photography if needed.     PROCEDURAL PAUSE  Prior to the beginning of the  procedure, the patient's correct identity, side, site, and procedure to be performed were verified.  The patient was given intravenous antibiotics prior to skin incision.     PROCEDURAL NOTE  In the preoperative area, a wise-pattern breast reduction was marked.  The patient was brought to the operative theater and was transferred operating room table.  All bony prominences were well padded, and sequential compression devices were placed to each lower extremity. Patient was then secured with safety straps.      The patient was then placed under IV sedation, and our anesthesia colleagues administered general endotracheal anesthesia.  We then prepped and draped in a standard sterile fashion.  We then started on the right side. We marked a 9cm wide inferior pedicle, and a 42mm NAC with cookie cutter.  A breast tourniquet was then placed and 15 blade scalpel was then used to incise the NAC and inferior pedicle.  The inferior pedicle was then de-epithelialized.  We then marked the remaining portion of the Wise pattern incision in the skin after releasing the breast tourniquet.  We removed the medial and lateral skin with a small amount of subcutaneous tissue.  We then raised the superior flaps keeping a 1.25 cm thickness throughout using PlasmaBlade cautery.  We dissected cephalad until we reached the clavicles, and then we began to remove breast tissue in a piecemeal fashion in order to achieve desired cosmetic size and shape.  We then irrigated thoroughly with 1 L of normal saline, followed by 1 L of irrisept solution, we then performed meticulous hemostasis using unipolar bipolar cautery.    The inferior pedicle was then supported with TIGR MATRIX surgical mesh, with 2-0 PDS to the pectoralis major fascia medially and the pec fascia and serratus fascia laterally, in order to provide soft tissue support.  Total amount of tissue resected from the right side was 841 g  A 15 Yoruba Round ANDREE drain was placed, exiting  lateral to the anterior axillary line.    10cc of  0.25% marcaine was injected into multiple intercostal nerves for post operative analgesia and not intraoperative pain control.    We then performed provisional closure with staples, and turned our attention to the left side.    We marked a 9cm wide inferior pedicle, and a 42mm NAC with cookie cutter.  A breast tourniquet was then placed and 15 blade scalpel was then used to incise the NAC and inferior pedicle.  The inferior pedicle was then de-epithelialized.  We then marked the remaining portion of the Wise pattern incision in the skin after releasing the breast tourniquet.  We removed the medial and lateral skin with a small amount of subcutaneous tissue.  We then raised the superior flaps keeping a 1.25 cm thickness throughout using PlasmaBlade cautery.  We dissected cephalad until we reached the clavicles, and then we began to remove breast tissue in a piecemeal fashion in order to achieve desired cosmetic size and shape.  We then irrigated thoroughly with 1 L of normal saline, followed by 1 L of irrisept solution, we then performed a meticulous hemostasis using unipolar bipolar cautery.   The inferior pedicle was then supported with TIGR MATRIX surgical mesh, with 2-0 PDS to the pectoralis major fascia medially and the pec fascia and serratus fascia laterally, in order to provide soft tissue support.    Total amount of tissue resected from the left side was 862  A 15 [place] Round ANDREE drain was placed, exiting lateral to the anterior axillary line.  10cc of  0.20% marcaine was injected into multiple intercostal nerves for post operative analgesia and not intraoperative pain control.  We then performed provisional closure with staples.      The patient was placed in upright position and symmetric NAC's were marked, approximately 6 cm cephalad from the IMF's, with 38mm sizerl these were judged to be symmetrical.  The patient was then laid down and we began closure.   The T point was closed with a buried 2-0 PDS suture.  The IMF was closed with a running 3-0 strata fix suture in the deep dermis, which was turned back and run in the subcuticular layer for 2 layer closure.  The vertical limb was then closed with interrupted 3-0 Monocryl suture in a deep dermal fashion.    We then cut out the NACs on each side with a 38 mm cookie cutter.  We provisionally closed with staples, and performed purse string suture with a double-stranded 4-0 Prolene suture.  After this, a 3-0 stratafix suture was used to close the subcuticular and dermal layers of the NAC.  Exxofin was placed over the incisions.  Surgical bra was placed with ABDs over the breasts, and fluffs in the axilla.  The NAC were warm and well-perfused at the conclusion of the procedure.  The patient tolerated the procedure well and was awakened from anesthesia without any difficulties, was transferred to the patient in stable condition, all needle counts were correct.    POST OP PLAN:  Patient will remain an outpatient.  We will see her back in 1 week for follow-up, she is discharged with narcotic pain medication, instruction to shower daily, and instruction to empty and record drain outputs twice daily.      Morris Martinez  Phone Number: [phone]

## 2024-09-17 NOTE — DISCHARGE INSTRUCTIONS
Plastic Surgery               Post Operative Instructions    Office Phone: [phone] or contact central scheduling  After-Hours Patient line: [phone]  (Use this number for medical questions/concerns only after 4pm or on the weekends)      Activity:   -no lifting greater than 10lbs for 2 weeks after surgery  -ok for walking and ambulating    Wound/Dressing Care:   -You will have a surgical bra, wear this daily. Ok to remove to shower and then replace.   -You will have surgical drains. Strip, empty, and record output 2-3 times daily. Bring record log of drain output with you to your follow up appointment in 1 week   -your incisions will have purple surgical glue, this is ok to get wet. You may shower beginning the day after surgery. We recommend warm/hot showers 1-2 times daily. Ok for soap and water.     Pain:      -For pain control we recommend alternating between tylenol and Ibuprofen every 3 hours for the first 3-5 days after surgery. Please monitor your Tylenol (acetaminophen) intake as your prescription pain medication has 325mg of Tylenol (acetaminophen) in it, do no exceed the max daily dose of 4,000mg in 24 hours. Use prescription pain medication for severe 7/10 pain or for break through pain.      Tylenol taken at 10:00 am due at 4:00 pm if needed    Scopalamine patch may stay on for 72 hrs.  Remove patch, discard away form pets, children.  Do not touch eyes!  Wash hands thoroughly    Do not take Motrin/Ibuprofen products until after 10:00 am on 9/18/24

## 2024-09-17 NOTE — H&P
History Of Present Illness  Livia Renteria is a 38 y.o. female presenting with macromasia.   She presents today to discuss ongoing back and shoulder pain. She endorses that since a teenager she has always had larger breasts. She notes that she sought consultation for breast reduction at the age of 19 however decided against it as she wanted to wait until she was finished having children. She endorses daily pain that increases with activity and with working and exercising. She rates her pain at 4/10 at baseline and will increase from there.  She endorses bra stap grooving from the weight of her breast. She has attempted conservative measures for her pain including heat/ice, OTC pain medications including tylenol and ibuprofen for her pain without resolution. She has attempted physical therapy in the past and denied improvement or resolution of symptoms.  She endorses that her breasts slightly increased in size after pregnancy and breast feeding. Her notes her current bra size is a 40F/G. She has complaints of bra strap grooving and neck/shoulder discomfort.      Past Medical History  Past Medical History:   Diagnosis Date    Anxiety     Depression     Other specified health status     No pertinent past medical history       Surgical History  Past Surgical History:   Procedure Laterality Date    OTHER SURGICAL HISTORY  10/27/2020    Knee surgery    WISDOM TOOTH EXTRACTION          Social History  She reports that she has never smoked. She has never used smokeless tobacco. She reports current alcohol use. She reports that she does not use drugs.    Family History  No family history on file.     Allergies  Patient has no known allergies.    Review of Systems   All other systems have been reviewed with the patient and have been found to be negative with exception to the chief complaint as mentioned in the history of present illness.    ROS: As noted in history of present illness  - CONSTITUTIONAL: Denies weight loss,  fever and chills.  - HEENT: Denies changes in vision and hearing.  - RESPIRATORY: Denies SOB and cough, difficulty breathing  - CV: Denies palpitations and CP  - GI: Denies abdominal pain, nausea, vomiting and diarrhea.  - : Denies dysuria and urinary frequency.  - MSK: Denies myalgia and joint pain.  - SKIN: Denies rash and pruritus.  - NEUROLOGICAL: Denies headache and syncope.  - PSYCHIATRIC: Denies recent changes in mood. Denies anxiety and depression.    I reviewed with the patient the remainder of the his personal, medical, surgical and social history, found not to be pertinent to chief complaint. I specifically reviewed the family history with patient, found not to be pertinent to chief complaint.    Physical Exam   Gen: interactive and pleasant  Head: NCAT  Eyes: EOMI, PERRLA  Mouth: MMM  Throat: trachea midline  Cor: RRR  Pulm: nonlabored breathing  Abd: s/nt/nd  Neuro: AAOx3  Ext: extremities perfused     Focused exam of the: breast                                 R                  L  SN:NAC             33cm              33.5cm  NAC:IMF            17cm             18cm         BW                    15cm              15cm        NAC diam         5m              5cm                                                                                           Ptosis Grade     2               2                                                       Bra Size         40F/G     Body mass index is 37.14 kg/m².  Body surface area is 2.11 meters squared.  Last Recorded Vitals  There were no vitals taken for this visit.    Relevant Results              Assessment/Plan   Assessment & Plan  Macromastia      Livia Renteria is a 38 y.o. female who was referred by Deborah Ozuna MD for evaluation of upper back pain secondary to macromastia.     She undoubtedly has macromastia which is symptomatic causing her pain, bra strap grooving, trapezial hypertrophy. She has attempted PT, chiropractic, massage, OTC pain medications,  and conservative measures for pain control without improvement or resolution of daily discomfort. Today we discussed moving forward with surgical intervention for continued symptomatic macromastia. I educated the patient on the Schnur scale as a use of measurement to determine to amount of breast tissue to take based on body surface area. Her BSA is 2.1. The schnur scale places her at the required amount per side of 750g.      In previous discussions, we mentioned with the patient that I believe this can be achieved and will help to alleviate back and neck pain she is experiencing. Patient verbalized understanding of the size requirement based on the Schnur scale and agrees to continue with surgical planning for breast reduction. We discussed today she intends to lose weight prior to surgery with goal BMI of 35 or less. We discussed she plans to lose 7lbs.        I discussed with the patient that this is reconstructive surgery to alleviate neck and back pain related to macromastia, and there are no guarantees of results, including aesthetic results.  Sometimes, patients are dissatisfied and sometimes patients require revision surgery.  If there is a need to return to surgery to correct any problems or complications, we indicated that in some instances, the return to the operating room may not covered by insurance.  I indicated that I do not charge the patient for return to the operating room, but there WILL BE charges for operating room/facility fees and anesthesia fees, over which we have no control.       I spent 20 minutes in the professional and overall care of this patient.      Morris Martinez MD

## 2024-09-17 NOTE — ANESTHESIA PREPROCEDURE EVALUATION
Patient: Livia Renteria    Procedure Information       Date/Time: 09/17/24 0945    Procedure: bilateral breast reduction (Bilateral) - bilateral breast reduction    [place] [facility] [place] [facility]    Surgeons: Morris Martinez MD          Recent URI/sinus infection- completed Zpack, on Amoxicillin day 8 of 10.. no cough over 48 hours, and no fever for over a week. Discussed risks benefits of proceeding with patient and surgeon, given good state of wellness- will proceed    Clinical information reviewed:   Tobacco  Allergies  Meds   Med Hx  Surg Hx  OB Status  Fam Hx  Soc   Hx        NPO Detail:  No data recorded     Physical Exam    Airway  Mallampati: II  TM distance: >3 FB  Neck ROM: full     Cardiovascular - normal exam  Rhythm: regular  Rate: normal     Dental - normal exam     Pulmonary - normal exam  Breath sounds clear to auscultation     Abdominal        Anesthesia Plan    History of general anesthesia?: yes  History of complications of general anesthesia?: no    ASA 2     general   (Will administer- preop albuterol)  The patient is not a current smoker.    intravenous induction   Anesthetic plan and risks discussed with patient and mother.    Plan discussed with CRNA.

## 2024-09-17 NOTE — ANESTHESIA POSTPROCEDURE EVALUATION
Patient: Livia Renteria    Procedure Summary       Date: 09/17/24 Room / [facility]: [facility] / [facility]    Anesthesia Start: 1002 Anesthesia Stop: 1347    Procedure: bilateral breast reduction (Bilateral) Diagnosis:       Macromastia      (Macromastia [N62])    Surgeons: Morris Martinez MD Responsible Provider: Conner Arboleda MD    Anesthesia Type: general ASA Status: 2            Anesthesia Type: general    Vitals Value Taken Time   BP 99/56 09/17/24 1413   Temp 36.6 °C (97.9 °F) 09/17/24 1347   Pulse 61 09/17/24 1413   Resp 16 09/17/24 1413   SpO2 98 % 09/17/24 1413       Anesthesia Post Evaluation    Patient location during evaluation: PACU  Patient participation: complete - patient participated  Level of consciousness: awake and alert  Pain management: satisfactory to patient  Multimodal analgesia pain management approach  Airway patency: patent  Cardiovascular status: acceptable  Respiratory status: acceptable  Hydration status: acceptable  Postoperative Nausea and Vomiting: none  Comments: Did well    No notable events documented.

## 2024-09-18 DIAGNOSIS — G89.18 POST-OP PAIN: Primary | ICD-10-CM

## 2024-09-18 RX ORDER — TRAMADOL HYDROCHLORIDE 50 MG/1
50 TABLET ORAL EVERY 6 HOURS PRN
Qty: 15 TABLET | Refills: 0 | Status: SHIPPED | OUTPATIENT
Start: 2024-09-18 | End: 2024-09-25

## 2024-09-23 ENCOUNTER — APPOINTMENT (OUTPATIENT)
Dept: PLASTIC SURGERY | Facility: CLINIC | Age: 39
End: 2024-09-23
Payer: COMMERCIAL

## 2024-09-25 ENCOUNTER — APPOINTMENT (OUTPATIENT)
Dept: PLASTIC SURGERY | Facility: CLINIC | Age: 39
End: 2024-09-25
Payer: COMMERCIAL

## 2024-09-25 VITALS — BODY MASS INDEX: 37.56 KG/M2 | HEIGHT: 64 IN | WEIGHT: 220 LBS

## 2024-09-25 DIAGNOSIS — G89.18 POST-OP PAIN: Primary | ICD-10-CM

## 2024-09-25 PROCEDURE — 3008F BODY MASS INDEX DOCD: CPT | Performed by: PHYSICIAN ASSISTANT

## 2024-09-25 PROCEDURE — 99024 POSTOP FOLLOW-UP VISIT: CPT | Performed by: PHYSICIAN ASSISTANT

## 2024-09-25 RX ORDER — TRAMADOL HYDROCHLORIDE 50 MG/1
50 TABLET ORAL EVERY 6 HOURS PRN
Qty: 15 TABLET | Refills: 0 | Status: SHIPPED | OUTPATIENT
Start: 2024-09-25 | End: 2024-10-02
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
## 2024-09-26 LAB
LABORATORY COMMENT REPORT: NORMAL
PATH REPORT.FINAL DX SPEC: NORMAL
PATH REPORT.GROSS SPEC: NORMAL
PATH REPORT.RELEVANT HX SPEC: NORMAL
PATH REPORT.TOTAL CANCER: NORMAL

## 2024-09-30 ENCOUNTER — APPOINTMENT (OUTPATIENT)
Dept: PLASTIC SURGERY | Facility: CLINIC | Age: 39
End: 2024-09-30
Payer: COMMERCIAL

## 2024-10-07 ENCOUNTER — TELEPHONE (OUTPATIENT)
Dept: PLASTIC SURGERY | Facility: CLINIC | Age: 39
End: 2024-10-07
Payer: COMMERCIAL

## 2024-10-07 DIAGNOSIS — N64.4 BREAST PAIN: ICD-10-CM

## 2024-10-07 NOTE — TELEPHONE ENCOUNTER
Patient called reporting breast pain and firmness on the side of her left breast.  She states this does not feel like swelling and states it feels like it did she when had mastitis when she was pregnant.  Denies fevers.  No redness to the area.  Notified BRIAN Muñoz.  Breast ultrasound ordered and instructed patient to call to schedule.

## 2024-10-09 ENCOUNTER — HOSPITAL ENCOUNTER (OUTPATIENT)
Dept: RADIOLOGY | Facility: HOSPITAL | Age: 39
Discharge: HOME | End: 2024-10-09
Payer: COMMERCIAL

## 2024-10-09 DIAGNOSIS — N63.0 PALPABLE MASS OF BREAST: ICD-10-CM

## 2024-10-09 DIAGNOSIS — N64.4 BREAST PAIN: ICD-10-CM

## 2024-10-09 PROCEDURE — 76642 ULTRASOUND BREAST LIMITED: CPT | Mod: LEFT SIDE | Performed by: RADIOLOGY

## 2024-10-09 PROCEDURE — 77065 DX MAMMO INCL CAD UNI: CPT | Mod: LEFT SIDE | Performed by: RADIOLOGY

## 2024-10-09 PROCEDURE — 76642 ULTRASOUND BREAST LIMITED: CPT | Mod: LT

## 2024-10-09 PROCEDURE — 76982 USE 1ST TARGET LESION: CPT | Mod: LT

## 2024-10-09 PROCEDURE — 77061 BREAST TOMOSYNTHESIS UNI: CPT | Mod: LEFT SIDE | Performed by: RADIOLOGY

## 2024-10-09 PROCEDURE — 77065 DX MAMMO INCL CAD UNI: CPT | Mod: LT

## 2024-10-14 ENCOUNTER — APPOINTMENT (OUTPATIENT)
Dept: RADIOLOGY | Facility: HOSPITAL | Age: 39
End: 2024-10-14
Payer: COMMERCIAL

## 2024-10-16 ENCOUNTER — APPOINTMENT (OUTPATIENT)
Dept: PLASTIC SURGERY | Facility: CLINIC | Age: 39
End: 2024-10-16
Payer: COMMERCIAL

## 2024-10-16 VITALS
DIASTOLIC BLOOD PRESSURE: 74 MMHG | WEIGHT: 220 LBS | HEART RATE: 88 BPM | SYSTOLIC BLOOD PRESSURE: 118 MMHG | BODY MASS INDEX: 37.56 KG/M2 | HEIGHT: 64 IN

## 2024-10-16 DIAGNOSIS — N62 MACROMASTIA: Primary | ICD-10-CM

## 2024-10-16 PROCEDURE — 3008F BODY MASS INDEX DOCD: CPT | Performed by: PHYSICIAN ASSISTANT

## 2024-10-16 PROCEDURE — 99024 POSTOP FOLLOW-UP VISIT: CPT | Performed by: PHYSICIAN ASSISTANT

## 2024-10-18 NOTE — PROGRESS NOTES
Department of Plastic and Reconstructive Surgery            Post Operative Visit    Date: 10/16/24  Date of Surgery: 9/17/2024    Subjective   Livia Renteria is a 38 y.o. female who presents for POV. They are s/p bilateral breast reduction on 9/17/24 with Dr. Martinez.       She presents for 1 month POV. She recently had US of breast for concerns of seroma, imaging showed no fluid collection. She endorsed there are a few areas of wounds that have opened up. She notes improving post op pain.       Objective   Vital Signs:   Vitals:    10/16/24 1503   BP: 118/74   Pulse: 88         Gen: interactive and pleasant  Head: NCAT  Eyes: EOMI, PERRLA  Mouth: MMM  Throat: trachea midline  Cor: RRR  Pulm: nonlabored breathing  Abd: s/nt/nd  Neuro: AAOx3  Ext: extremities perfused    Focused exam of the: breast    Right breast with surgical incisions that are mostly healed. There is a small 1cm wound at 6:00 of the nipple at the T-point.There is expected edema of the breast without evidence of seroma or hematoma.     Left breast with small t-point wound. There is white slough present in wound bed. There is indurated area of the lateral breast at the site of tissue edema noted on US. There is no ballotable fluid collection. NAC is well perfused.     Assessment/Plan     Livia Renteria is a 38 y.o. female  who presents for POV. They are s/p bilateral breast reduction on 9/17/24 with Dr. Martinez.     She presents for POV. There are 2 sites of delayed wound healing on the right and left breast both approx 1cm. Silver nitrate was applied to white fibrinous slough in office. She was instructed to apply dry to dry gauze to the sites until the slough is removed followed by bacitracin and gauze.       Plan:   Follow up in 2-3 weeks   Dry to dry gauze until slough is gone then bacitracin and gauze.   Patient was instructed to contact office immediately if breast becomes warm, red, fevers, chills, or worsening wounds.      I spent 20 minutes with this patient. Greater than 50% of this time was spent in the counselling and/or coordination of care of this patient.  This note was created using voice recognition software and was not corrected for typographical or grammatical errors.    Signature: Wanda Jurado PA-C

## 2024-11-06 ENCOUNTER — APPOINTMENT (OUTPATIENT)
Dept: PLASTIC SURGERY | Facility: CLINIC | Age: 39
End: 2024-11-06
Payer: COMMERCIAL

## 2024-11-06 VITALS — BODY MASS INDEX: 37.56 KG/M2 | HEIGHT: 64 IN | WEIGHT: 220 LBS

## 2024-11-06 DIAGNOSIS — N62 MACROMASTIA: Primary | ICD-10-CM

## 2024-11-06 PROCEDURE — 99024 POSTOP FOLLOW-UP VISIT: CPT | Performed by: PHYSICIAN ASSISTANT

## 2024-11-06 PROCEDURE — 3008F BODY MASS INDEX DOCD: CPT | Performed by: PHYSICIAN ASSISTANT

## 2024-11-06 NOTE — PROGRESS NOTES
Department of Plastic and Reconstructive Surgery            Post Operative Visit    Date: 10/16/24  Date of Surgery: 9/17/2024    Subjective   Livia Renteria is a 38 y.o. female who presents for POV. They are s/p bilateral breast reduction on 9/17/24 with Dr. Martinez.       She presents for POV. She is 6 weeks post op. She is recovering well. She endorses improvement in swelling of the breasts. She endorses all sites of delayed wound healing have resolved. Pain is improved .      Objective   Vital Signs:   There were no vitals filed for this visit.  Gen: interactive and pleasant  Head: NCAT  Eyes: EOMI, PERRLA  Mouth: MMM  Throat: trachea midline  Cor: RRR  Pulm: nonlabored breathing  Abd: s/nt/nd  Neuro: AAOx3  Ext: extremities perfused    Focused exam of the: breast    Right breast with surgical incisions that are well healed. There is no evidence of hematoma or seroma. Delayed wound healing has resolved. Edema is improving.     Left breast with surgical incisions that are well healed. There is no evidence of hematoma or seroma. Delayed wound healing has resolved. Edema is improving.     Assessment/Plan     Livia Renteria is a 38 y.o. female  who presents for POV. They are s/p bilateral breast reduction on 9/17/24 with Dr. Martinez.     She presents for POV. She is 6 weeks post op. All delayed wound healing has resolved. Edema is improving. She may continue with sports bras or transition into normal bras. She may begin scar care with topical scar products including silicon based scar products. We will see her back at 90 day POV.       Plan:   Follow up at 90 day POV    I spent 20 minutes with this patient. Greater than 50% of this time was spent in the counselling and/or coordination of care of this patient.  This note was created using voice recognition software and was not corrected for typographical or grammatical errors.    Signature: Wanda Jurado PA-C

## 2024-12-16 ENCOUNTER — APPOINTMENT (OUTPATIENT)
Dept: PLASTIC SURGERY | Facility: CLINIC | Age: 39
End: 2024-12-16
Payer: COMMERCIAL

## 2024-12-16 VITALS
BODY MASS INDEX: 37.56 KG/M2 | SYSTOLIC BLOOD PRESSURE: 117 MMHG | HEART RATE: 116 BPM | HEIGHT: 64 IN | WEIGHT: 220 LBS | DIASTOLIC BLOOD PRESSURE: 82 MMHG

## 2024-12-16 DIAGNOSIS — N62 MACROMASTIA: Primary | ICD-10-CM

## 2024-12-16 PROCEDURE — 99024 POSTOP FOLLOW-UP VISIT: CPT | Performed by: PHYSICIAN ASSISTANT

## 2024-12-16 PROCEDURE — 3008F BODY MASS INDEX DOCD: CPT | Performed by: PHYSICIAN ASSISTANT

## 2024-12-16 NOTE — PROGRESS NOTES
Department of Plastic and Reconstructive Surgery            Post Operative Visit    Date: 12/16/24  Date of Surgery: 9/17/2024    Subjective   Livia Renteria is a 38 y.o. female who presents for POV. They are s/p bilateral breast reduction on 9/17/24 with Dr. Martinez.       She presents for 90 day POV. She has recovered well. She is overall happy with surgical outcome. She endorses improvement in her back and shoulder pain. She has been using silicone scar sheets.       Objective   Vital Signs:   Vitals:    12/16/24 1436   BP: 117/82   Pulse: (!) 116     Gen: interactive and pleasant  Head: NCAT  Eyes: EOMI, PERRLA  Mouth: MMM  Throat: trachea midline  Cor: RRR  Pulm: nonlabored breathing  Abd: s/nt/nd  Neuro: AAOx3  Ext: extremities perfused    Focused exam of the: breast    Right breast with surgical incisions that are well healed. There is no evidence of hematoma or seroma. No delayed wound healing present.     Left breast with surgical incisions that are well healed. There is no evidence of hematoma or seroma. Delayed wound healing has resolved.     Assessment/Plan     Livia Renteria is a 38 y.o. female  who presents for POV. They are s/p bilateral breast reduction on 9/17/24 with Dr. Martinez.     She presents for POV. She has recovered well. She has no complaints at this time. She noted improvement in back and shoulder pain. We discussed continuing scar care daily with massage and topical silicone based scar/gel or tape. We will see her back in 1 year from surgery. If she has questions or concerns she should follow up with the office.     Plan:   Follow up 1 year from surgery  Continue scar care    I spent 10 minutes with this patient. Greater than 50% of this time was spent in the counselling and/or coordination of care of this patient.  This note was created using voice recognition software and was not corrected for typographical or grammatical errors.    Signature: Wanda Jurado,  KM

## 2025-02-19 DIAGNOSIS — F41.9 ANXIETY: ICD-10-CM

## 2025-02-19 RX ORDER — ESCITALOPRAM OXALATE 20 MG/1
20 TABLET ORAL
Qty: 30 TABLET | Refills: 0 | Status: SHIPPED | OUTPATIENT
Start: 2025-02-19

## 2025-02-19 NOTE — TELEPHONE ENCOUNTER
Patient is requesting refill on lexapro last appointment was 02/01/2024, next appointment scheduled for 3/14/25 she has one pill left with no refills

## 2025-03-10 ENCOUNTER — APPOINTMENT (OUTPATIENT)
Dept: PRIMARY CARE | Facility: CLINIC | Age: 40
End: 2025-03-10
Payer: COMMERCIAL

## 2025-03-10 VITALS
SYSTOLIC BLOOD PRESSURE: 106 MMHG | OXYGEN SATURATION: 97 % | WEIGHT: 219.6 LBS | DIASTOLIC BLOOD PRESSURE: 70 MMHG | HEART RATE: 83 BPM | BODY MASS INDEX: 37.69 KG/M2

## 2025-03-10 DIAGNOSIS — F41.9 ANXIETY: ICD-10-CM

## 2025-03-10 PROCEDURE — 99213 OFFICE O/P EST LOW 20 MIN: CPT | Performed by: FAMILY MEDICINE

## 2025-03-10 PROCEDURE — 1036F TOBACCO NON-USER: CPT | Performed by: FAMILY MEDICINE

## 2025-03-10 RX ORDER — ESCITALOPRAM OXALATE 20 MG/1
20 TABLET ORAL
Qty: 90 TABLET | Refills: 1 | Status: SHIPPED | OUTPATIENT
Start: 2025-03-10

## 2025-03-10 ASSESSMENT — PATIENT HEALTH QUESTIONNAIRE - PHQ9
1. LITTLE INTEREST OR PLEASURE IN DOING THINGS: NOT AT ALL
SUM OF ALL RESPONSES TO PHQ9 QUESTIONS 1 AND 2: 0
2. FEELING DOWN, DEPRESSED OR HOPELESS: NOT AT ALL

## 2025-03-12 NOTE — PROGRESS NOTES
Subjective   Patient ID: Livia Renteria is a 39 y.o. female who presents for Anxiety (Lexapro refills. ).  Pt doing well on current meds, no new complaints.  Last appt just over a year ago.      Histories reviewed      Objective   /70   Pulse 83   Wt 99.6 kg (219 lb 9.6 oz)   LMP 02/23/2025 (Exact Date)   SpO2 97%   BMI 37.69 kg/m²    Physical Exam    Alert adult, NAD  Affect pleasant  Heart RRR no murmur  Lungs CTA bilat    Assessment & Plan  Anxiety  Follow up annually or prn  Orders:    escitalopram (Lexapro) 20 mg tablet; Take 1 tablet (20 mg) by mouth once daily.

## 2025-06-23 DIAGNOSIS — L71.9 ROSACEA: ICD-10-CM

## 2025-06-24 DIAGNOSIS — L71.9 ROSACEA: Primary | ICD-10-CM

## 2025-06-24 RX ORDER — METRONIDAZOLE 7.5 MG/G
CREAM TOPICAL
Qty: 45 G | Refills: 3 | OUTPATIENT
Start: 2025-06-24

## 2025-06-24 RX ORDER — METRONIDAZOLE 7.5 MG/G
CREAM TOPICAL
Qty: 45 G | Refills: 0 | Status: SHIPPED | OUTPATIENT
Start: 2025-06-24

## 2025-06-24 NOTE — TELEPHONE ENCOUNTER
[facility] Staff to call and reschedule December appointment for an appointment that is earlier with Dr. Davenport. Answered patients MyChart message that we would refill x1 with 0 refills, must have earlier appointment. Pended prescription for Dr. Davenport

## 2025-08-11 ENCOUNTER — APPOINTMENT (OUTPATIENT)
Dept: PRIMARY CARE | Facility: CLINIC | Age: 40
End: 2025-08-11
Payer: COMMERCIAL

## 2025-10-07 ENCOUNTER — APPOINTMENT (OUTPATIENT)
Dept: PRIMARY CARE | Facility: CLINIC | Age: 40
End: 2025-10-07
Payer: COMMERCIAL

## 2025-12-15 ENCOUNTER — APPOINTMENT (OUTPATIENT)
Dept: PLASTIC SURGERY | Facility: CLINIC | Age: 40
End: 2025-12-15
Payer: COMMERCIAL

## 2025-12-19 ENCOUNTER — APPOINTMENT (OUTPATIENT)
Dept: DERMATOLOGY | Facility: CLINIC | Age: 40
End: 2025-12-19
Payer: COMMERCIAL

## (undated) DEVICE — TOWEL, SURGICAL, NEURO, O/R, 16 X 26, BLUE, STERILE

## (undated) DEVICE — PACK, BASIC

## (undated) DEVICE — SYRINGE, 20 CC, LUER LOCK

## (undated) DEVICE — Device

## (undated) DEVICE — DRESSING, NON-ADHERENT, TELFA, 2 X 3 IN, STERILE

## (undated) DEVICE — SUTURE, MONOCRYL, 4-0, 18 IN, PS2, UNDYED

## (undated) DEVICE — STAPLER, SKIN, PLUS, REGULAR, 35

## (undated) DEVICE — SUTURE, PDS II, 5-0, 18 IN, P3, CLEAR

## (undated) DEVICE — SYRINGE, 3 CC, LUER LOCK, W/NEEDLE, 23 G X 1 IN

## (undated) DEVICE — SYRINGE, 10 CC, LUER LOCK

## (undated) DEVICE — SUTURE, MONOCRYL, 3-0, 27 IN, SH, UNDYED

## (undated) DEVICE — DRESSING, GAUZE, SPONGE, KERLIX, SUPER, 6 X 6.75 IN, STERILE 10PK

## (undated) DEVICE — MARKER, SKIN, RULER AND LABEL PACK, CUSTOM

## (undated) DEVICE — GLOVE, SURGICAL, PROTEGRITY, NEU-THERA, 8.0, PF, LATEX

## (undated) DEVICE — NEEDLE, FILTER 19 G X 1 IN

## (undated) DEVICE — SYRINGE, 60 CC, IRRIGATION, BULB, CONTRO-BULB, PAPER POUCH

## (undated) DEVICE — BANDAGE, ELASTIC, REINFORCED, ECONO-WRAP, 4 IN X 4.5 YD, LATEX

## (undated) DEVICE — APPLICATOR, CHLORAPREP, W/ORANGE TINT, 26ML

## (undated) DEVICE — EVACUATOR, WOUND, SUCTION, CLOSED, JACKSON-PRATT, 100 CC, SILICONE

## (undated) DEVICE — BLADE, PLASMA, PEAK, 3.0S LIGHT

## (undated) DEVICE — MARKER, SKIN, REGULAR TIP, W/W/FLEXI RULER, LABEL

## (undated) DEVICE — SPONGE, LAP, XRAY DECT, 18IN X 18IN, W/MASTER DMT, STERILE

## (undated) DEVICE — COVER, PLASTIC, MAYO STAND, 29.5IN X 55.5IN

## (undated) DEVICE — DRESSING, ABDOMINAL PAD, CURITY, 8 X 10 IN

## (undated) DEVICE — SUTURE, PDS II, 2-0, 27 IN, SH, VIOLET

## (undated) DEVICE — TISSUE ADHESIVE, EXOFIN, 1ML

## (undated) DEVICE — SUTURE, VICRYL, 3-0, 27 IN, SH, VIOLET

## (undated) DEVICE — BANDAGE, ELASTIC, REINFORCED, ECONO-WRAP, 6 IN X 4.5 YD, LATEX

## (undated) DEVICE — APPLIER, CLIP, PREMIUM II, SURGICLIP, M- 11.5

## (undated) DEVICE — ADHESIVE, DERMABOND, PRINEO, 12 X 9, STERILE

## (undated) DEVICE — SUTURE, PLAIN GUT, 4-0, RB-1, 27"

## (undated) DEVICE — SUTURE, SILK, 0, 30 IN, CT-1, BLACK

## (undated) DEVICE — GOWN, ASTOUND, L

## (undated) DEVICE — GLOVE, SURGICAL, PROTEXIS PI , 7.5, PF, LF